# Patient Record
Sex: FEMALE | Race: OTHER | HISPANIC OR LATINO | URBAN - METROPOLITAN AREA
[De-identification: names, ages, dates, MRNs, and addresses within clinical notes are randomized per-mention and may not be internally consistent; named-entity substitution may affect disease eponyms.]

---

## 2019-10-27 ENCOUNTER — EMERGENCY (EMERGENCY)
Facility: HOSPITAL | Age: 59
LOS: 1 days | Discharge: HOME | End: 2019-10-27
Attending: EMERGENCY MEDICINE
Payer: COMMERCIAL

## 2019-10-27 VITALS
HEIGHT: 64 IN | SYSTOLIC BLOOD PRESSURE: 145 MMHG | TEMPERATURE: 98 F | DIASTOLIC BLOOD PRESSURE: 68 MMHG | RESPIRATION RATE: 20 BRPM | OXYGEN SATURATION: 99 % | HEART RATE: 73 BPM | WEIGHT: 158.07 LBS

## 2019-10-27 LAB
ALBUMIN SERPL ELPH-MCNC: 4.3 G/DL — SIGNIFICANT CHANGE UP (ref 3.3–5.2)
ALP SERPL-CCNC: 82 U/L — SIGNIFICANT CHANGE UP (ref 40–120)
ALT FLD-CCNC: 19 U/L — SIGNIFICANT CHANGE UP
ANION GAP SERPL CALC-SCNC: 15 MMOL/L — SIGNIFICANT CHANGE UP (ref 5–17)
APPEARANCE UR: CLEAR — SIGNIFICANT CHANGE UP
AST SERPL-CCNC: 20 U/L — SIGNIFICANT CHANGE UP
BASOPHILS # BLD AUTO: 0.04 K/UL — SIGNIFICANT CHANGE UP (ref 0–0.2)
BASOPHILS NFR BLD AUTO: 0.4 % — SIGNIFICANT CHANGE UP (ref 0–2)
BILIRUB SERPL-MCNC: 0.7 MG/DL — SIGNIFICANT CHANGE UP (ref 0.4–2)
BILIRUB UR-MCNC: NEGATIVE — SIGNIFICANT CHANGE UP
BUN SERPL-MCNC: 12 MG/DL — SIGNIFICANT CHANGE UP (ref 8–20)
CALCIUM SERPL-MCNC: 9.2 MG/DL — SIGNIFICANT CHANGE UP (ref 8.6–10.2)
CHLORIDE SERPL-SCNC: 101 MMOL/L — SIGNIFICANT CHANGE UP (ref 98–107)
CO2 SERPL-SCNC: 25 MMOL/L — SIGNIFICANT CHANGE UP (ref 22–29)
COLOR SPEC: YELLOW — SIGNIFICANT CHANGE UP
CREAT SERPL-MCNC: 0.64 MG/DL — SIGNIFICANT CHANGE UP (ref 0.5–1.3)
DIFF PNL FLD: NEGATIVE — SIGNIFICANT CHANGE UP
EOSINOPHIL # BLD AUTO: 0.16 K/UL — SIGNIFICANT CHANGE UP (ref 0–0.5)
EOSINOPHIL NFR BLD AUTO: 1.6 % — SIGNIFICANT CHANGE UP (ref 0–6)
GLUCOSE SERPL-MCNC: 91 MG/DL — SIGNIFICANT CHANGE UP (ref 70–115)
GLUCOSE UR QL: NEGATIVE MG/DL — SIGNIFICANT CHANGE UP
HCT VFR BLD CALC: 44.3 % — SIGNIFICANT CHANGE UP (ref 34.5–45)
HGB BLD-MCNC: 14.8 G/DL — SIGNIFICANT CHANGE UP (ref 11.5–15.5)
IMM GRANULOCYTES NFR BLD AUTO: 0.4 % — SIGNIFICANT CHANGE UP (ref 0–1.5)
KETONES UR-MCNC: NEGATIVE — SIGNIFICANT CHANGE UP
LACTATE BLDV-MCNC: 0.8 MMOL/L — SIGNIFICANT CHANGE UP (ref 0.5–2)
LEUKOCYTE ESTERASE UR-ACNC: NEGATIVE — SIGNIFICANT CHANGE UP
LIDOCAIN IGE QN: 26 U/L — SIGNIFICANT CHANGE UP (ref 22–51)
LYMPHOCYTES # BLD AUTO: 2.38 K/UL — SIGNIFICANT CHANGE UP (ref 1–3.3)
LYMPHOCYTES # BLD AUTO: 23.8 % — SIGNIFICANT CHANGE UP (ref 13–44)
MCHC RBC-ENTMCNC: 29.7 PG — SIGNIFICANT CHANGE UP (ref 27–34)
MCHC RBC-ENTMCNC: 33.4 GM/DL — SIGNIFICANT CHANGE UP (ref 32–36)
MCV RBC AUTO: 88.8 FL — SIGNIFICANT CHANGE UP (ref 80–100)
MONOCYTES # BLD AUTO: 0.7 K/UL — SIGNIFICANT CHANGE UP (ref 0–0.9)
MONOCYTES NFR BLD AUTO: 7 % — SIGNIFICANT CHANGE UP (ref 2–14)
NEUTROPHILS # BLD AUTO: 6.67 K/UL — SIGNIFICANT CHANGE UP (ref 1.8–7.4)
NEUTROPHILS NFR BLD AUTO: 66.8 % — SIGNIFICANT CHANGE UP (ref 43–77)
NITRITE UR-MCNC: NEGATIVE — SIGNIFICANT CHANGE UP
PH UR: 7 — SIGNIFICANT CHANGE UP (ref 5–8)
PLATELET # BLD AUTO: 199 K/UL — SIGNIFICANT CHANGE UP (ref 150–400)
POTASSIUM SERPL-MCNC: 3.9 MMOL/L — SIGNIFICANT CHANGE UP (ref 3.5–5.3)
POTASSIUM SERPL-SCNC: 3.9 MMOL/L — SIGNIFICANT CHANGE UP (ref 3.5–5.3)
PROT SERPL-MCNC: 7.9 G/DL — SIGNIFICANT CHANGE UP (ref 6.6–8.7)
PROT UR-MCNC: NEGATIVE MG/DL — SIGNIFICANT CHANGE UP
RBC # BLD: 4.99 M/UL — SIGNIFICANT CHANGE UP (ref 3.8–5.2)
RBC # FLD: 12.2 % — SIGNIFICANT CHANGE UP (ref 10.3–14.5)
SODIUM SERPL-SCNC: 141 MMOL/L — SIGNIFICANT CHANGE UP (ref 135–145)
SP GR SPEC: 1.01 — SIGNIFICANT CHANGE UP (ref 1.01–1.02)
UROBILINOGEN FLD QL: NEGATIVE MG/DL — SIGNIFICANT CHANGE UP
WBC # BLD: 9.99 K/UL — SIGNIFICANT CHANGE UP (ref 3.8–10.5)
WBC # FLD AUTO: 9.99 K/UL — SIGNIFICANT CHANGE UP (ref 3.8–10.5)

## 2019-10-27 PROCEDURE — 81003 URINALYSIS AUTO W/O SCOPE: CPT

## 2019-10-27 PROCEDURE — T1013: CPT

## 2019-10-27 PROCEDURE — 96361 HYDRATE IV INFUSION ADD-ON: CPT

## 2019-10-27 PROCEDURE — 85027 COMPLETE CBC AUTOMATED: CPT

## 2019-10-27 PROCEDURE — 80053 COMPREHEN METABOLIC PANEL: CPT

## 2019-10-27 PROCEDURE — 74177 CT ABD & PELVIS W/CONTRAST: CPT | Mod: 26

## 2019-10-27 PROCEDURE — 83605 ASSAY OF LACTIC ACID: CPT

## 2019-10-27 PROCEDURE — 96374 THER/PROPH/DIAG INJ IV PUSH: CPT | Mod: XU

## 2019-10-27 PROCEDURE — 99285 EMERGENCY DEPT VISIT HI MDM: CPT

## 2019-10-27 PROCEDURE — 36415 COLL VENOUS BLD VENIPUNCTURE: CPT

## 2019-10-27 PROCEDURE — 83690 ASSAY OF LIPASE: CPT

## 2019-10-27 PROCEDURE — 99284 EMERGENCY DEPT VISIT MOD MDM: CPT | Mod: 25

## 2019-10-27 PROCEDURE — 74177 CT ABD & PELVIS W/CONTRAST: CPT

## 2019-10-27 RX ORDER — ONDANSETRON 8 MG/1
4 TABLET, FILM COATED ORAL ONCE
Refills: 0 | Status: COMPLETED | OUTPATIENT
Start: 2019-10-27 | End: 2019-10-27

## 2019-10-27 RX ORDER — MORPHINE SULFATE 50 MG/1
4 CAPSULE, EXTENDED RELEASE ORAL ONCE
Refills: 0 | Status: DISCONTINUED | OUTPATIENT
Start: 2019-10-27 | End: 2019-10-27

## 2019-10-27 RX ORDER — SODIUM CHLORIDE 9 MG/ML
1000 INJECTION INTRAMUSCULAR; INTRAVENOUS; SUBCUTANEOUS ONCE
Refills: 0 | Status: COMPLETED | OUTPATIENT
Start: 2019-10-27 | End: 2019-10-27

## 2019-10-27 RX ADMIN — SODIUM CHLORIDE 1000 MILLILITER(S): 9 INJECTION INTRAMUSCULAR; INTRAVENOUS; SUBCUTANEOUS at 12:22

## 2019-10-27 RX ADMIN — SODIUM CHLORIDE 1000 MILLILITER(S): 9 INJECTION INTRAMUSCULAR; INTRAVENOUS; SUBCUTANEOUS at 15:31

## 2019-10-27 RX ADMIN — SODIUM CHLORIDE 1000 MILLILITER(S): 9 INJECTION INTRAMUSCULAR; INTRAVENOUS; SUBCUTANEOUS at 15:29

## 2019-10-27 RX ADMIN — MORPHINE SULFATE 4 MILLIGRAM(S): 50 CAPSULE, EXTENDED RELEASE ORAL at 15:50

## 2019-10-27 NOTE — ED ADULT NURSE NOTE - PAIN: PRESENCE, MLM
complains of pain/discomfort
Is This A New Presentation, Or A Follow-Up?: Skin Lesions
What Type Of Note Output Would You Prefer (Optional)?: Standard Output
How Severe Is Your Skin Lesion?: mild
Has Your Skin Lesion Been Treated?: not been treated
Additional History: \\nHe has some rough raised spots on scalp and left elbow and a tender spot on right temple. He has hx of AK and SCC in situ.

## 2019-10-27 NOTE — ED STATDOCS - OBJECTIVE STATEMENT
58 y/o female no pmh c/o 2 days of llq pain. No n/v/d/obstipation/f/c/dysuria. Had fibroids and one ovary removed in past. Secondary complaint of lip swelling 2 months ago that resolved (chapping).     ROS: No fever/chills. No eye pain/changes in vision, No ear pain/sore throat/dysphagia, No chest pain/palpitations. No SOB/cough/. No  N/V/D, no black/bloody bm. No dysuria/frequency/discharge, No headache. No Dizziness.    No rashes or breaks in skin. No numbness/tingling/weakness.

## 2019-10-27 NOTE — ED ADULT NURSE NOTE - OBJECTIVE STATEMENT
59 yof presents to ed c/o llq ab pain denies n/v/d denies headache/ fever chills. pain started 2 days ago and has gotten worse denies recent sick contacts. nothing makes pain better or worse. ambulates

## 2019-10-27 NOTE — ED STATDOCS - CARE PLAN
Principal Discharge DX:	Inguinal hernia without obstruction or gangrene, recurrence not specified, unspecified laterality

## 2019-10-27 NOTE — ED STATDOCS - ATTENDING CONTRIBUTION TO CARE
Makenzie: I performed a face to face bedside interview with patient regarding history of present illness, review of symptoms and past medical history. I completed an independent physical exam and ordered tests/medications as needed.  I have discussed patient's plan of care with advanced care provider. The advanced care provider assisted in  executing the discussed plan. I was available for any questions or issues that may have arose during the execution of the plan of care.

## 2019-10-27 NOTE — ED STATDOCS - PHYSICAL EXAMINATION
Gen: No acute distress, non toxic  HEENT: Mucous membranes moist, pink conjunctivae, EOMI. chap lips, no significnat swelling.   CV: RRR, nl s1/s2.  Resp: CTAB, normal rate and effort  GI: llq ttp, no rebound, soft. neg murphys  : No CVAT  Neuro: A&O x 3, moving all 4 extremities  MSK: No spine or joint tenderness to palpation  Skin: No rashes. intact and perfused.

## 2019-10-27 NOTE — CONSULT NOTE ADULT - SUBJECTIVE AND OBJECTIVE BOX
ACUTE CARE SURGERY CONSULT    Consulting surgical team: Acute Care Surgery  Consulting attending: Dr. Obey Guerra  Patient seen and examined: 10-27-19 @ 15:44    HPI:  60yo female presents to the ED with left groin pain that started yesterday afternoon. Patient has felt a "bump" on the left groin for the past few months which she desribess becomes a bit painful intermittently, but is always self-limited and resolves. Patient denies changes in bowel movements. Last BM was this morning. Currently, denies chest pain, SOB, nausea, vomiting, fevers or chills.     PAST MEDICAL HISTORY:  uterine fibroids    PAST SURGICAL HISTORY:  oophorectomy, hysterectomy    ALLERGIES:  No Known Allergies      FAMILY HISTORY:  noncontributory    SOCIAL HISTORY:  denies toxic habits  HOME MEDICATIONS:  none    MEDICATIONS  (STANDING):    MEDICATIONS  (PRN):      VITALS & I/Os:  Vital Signs Last 24 Hrs  T(C): 36.7 (27 Oct 2019 11:27), Max: 36.7 (27 Oct 2019 11:27)  T(F): 98 (27 Oct 2019 11:27), Max: 98 (27 Oct 2019 11:27)  HR: 73 (27 Oct 2019 11:27) (73 - 73)  BP: 145/68 (27 Oct 2019 11:27) (145/68 - 145/68)  BP(mean): --  RR: 20 (27 Oct 2019 11:27) (20 - 20)  SpO2: 99% (27 Oct 2019 11:27) (99% - 99%)  CAPILLARY BLOOD GLUCOSE          I&O's Summary      GENERAL: Alert, in no acute distress.  MENTAL STATUS: AAOx3. Appropriate affect.  HEENT: PERRLA. EOMI  RESPIRATORY: CTAB  CARDIOVASCULAR: RRR  GASTROINTESTINAL: Abdomen soft, NT, ND, -R/-G.; left inguinal hernia, tender, reducible      LABS:                        14.8   9.99  )-----------( 199      ( 27 Oct 2019 12:27 )             44.3     10-27    141  |  101  |  12.0  ----------------------------<  91  3.9   |  25.0  |  0.64    Ca    9.2      27 Oct 2019 12:27    TPro  7.9  /  Alb  4.3  /  TBili  0.7  /  DBili  x   /  AST  20  /  ALT  19  /  AlkPhos  82  10-27    Lactate:            10-27 @ 15:25  0.8    Urinalysis Basic - ( 27 Oct 2019 11:53 )    Color: Yellow / Appearance: Clear / S.010 / pH: x  Gluc: x / Ketone: Negative  / Bili: Negative / Urobili: Negative mg/dL   Blood: x / Protein: Negative mg/dL / Nitrite: Negative   Leuk Esterase: Negative / RBC: x / WBC x   Sq Epi: x / Non Sq Epi: x / Bacteria: x        IMAGING:  < from: CT Abdomen and Pelvis w/ Oral Cont and w/ IV Cont (10.27.19 @ 14:31) >     EXAM:  CT ABDOMEN AND PELVIS OC IC                          PROCEDURE DATE:  10/27/2019          INTERPRETATION:  CLINICAL INFORMATION: Left lower quadrant abdominal pain    COMPARISON: None.    PROCEDURE:   CT of the Abdomen and Pelvis was performed with intravenous contrast.   Intravenous contrast: 96 ml Omnipaque 300. 4 ml discarded.  Oral contrast: positive contrast was administered.  Sagittal and coronal reformats were performed.    FINDINGS:    LOWER CHEST: Within normal limits.    LIVER: Steatosis. A less than 5 mm hypodense focus is identified within   the posterior right hepatic lobe, too small to characterize.  BILE DUCTS: Normal caliber.  GALLBLADDER: Within normal limits. No gallstones, gallbladder wall   thickening or pericholecystic fluid identified.  SPLEEN: Within normal limits.  PANCREAS: Within normal limits.  ADRENALS: Within normal limits.  KIDNEYS/URETERS: There is no evidence for bilateral hydronephrosis, renal   calculi or space-occupying lesions of the renal parenchyma.    BLADDER: Minimally distended.  REPRODUCTIVE ORGANS: The uterus is not visualized. Correlate with   surgical history.    BOWEL: Fecal material scattered throughout the colon. No mechanical bowel   obstruction is identified. No colonic wall thickening is noted. There is   no evidence for acute appendicitis.  PERITONEUM: There is a fat-containing left inguinal hernia with   circumferential thickening of the left inguinal canal margin and trace   free fluid. There is stranding of the left lower quadrant omental   mesenteric fat with thickening of the adjacent fascial plane. This   appearance is highly suggestive for incarceration/infarction of the left   inguinal herniated fat.  VESSELS: Within normal limits.  RETROPERITONEUM/LYMPH NODES: No lymphadenopathy.    ABDOMINAL WALL: Pelvic floor weakness is identified with cystocele and   enterocele formation.  BONES: No osseous fractures identified.    IMPRESSION:     There is a fat-containing left inguinal hernia with circumferential   thickening of the left inguinal canal margin and trace free fluid. There   is stranding of the left lower quadrant omental mesenteric fat with   thickening of the adjacent fascial plane. This appearance is highly   suggestive for incarceration/infarction of the left inguinal herniated   fat. Pelvic floor weakness is identified with cystocele and enterocele   formation.    Findings were discussed with Dr. CANDI WEBSTER [id number] 10/27/2019   3:08 PM by Dr. Tai with read back confirmation.                  MIR TAI   This document has been electronically signed. Oct 27 2019  3:08PM                  < end of copied text >

## 2019-10-27 NOTE — CONSULT NOTE ADULT - ASSESSMENT
48yo female with reducible left inguinal hernia.   - Patient's left inguinal hernia reduced at bedside after IV pain medication administration and ice packs to left groin  - Will reexamine patient in 1-2hrs, if patient's symptoms improve, will be able to d/c home 50yo female with reducible left inguinal hernia.   - Patient's left inguinal hernia reduced at bedside after IV pain medication administration and ice packs to left groin  - Will reexamine patient in 1-2hrs, if patient's symptoms improve, will be able to d/c home      Addendum:  -Reevaluated patient, less tender from left inguinal area, hernia reduced, able to ambualte independently  -No acute surgical intervention at this time  -Follow-up with Dr. Guerra 1-2 weeks

## 2019-10-27 NOTE — ED STATDOCS - PATIENT PORTAL LINK FT
You can access the FollowMyHealth Patient Portal offered by [facility] by registering at the following website: [email or web address]. By joining [employer]’s FollowMyHealth portal, you will also be able to view your health information using other applications (apps) compatible with our system.

## 2019-10-27 NOTE — ED STATDOCS - PROGRESS NOTE DETAILS
pt is seen by dr miller initially agreed with hx , PE and plan ,   CT with incarcerated hernia , bene told to the pain ,lactate and pain med given ,pt is not in acute pain , called surgery team , to seen the pt , lactate is negative pt is seen by dr miller initially agreed with hx , PE and plan ,   CT with fat inguinal hernia , bene told to the pain ,lactate and pain med given ,pt is not in acute pain , called surgery team , to seen the pt , lactate is negative as per surgery they reduced the fat on left side hernia , states they will come and re access the pt , as per surgery pt is stable cane morris jacob within 1 week, all result hared with pt as per surgery they reduced the fat on left side hernia , states they will come and re access the pt , as per surgery pt is stable cane be dYennic f.u dr jacob within 1 week, all result hared with pt  all d.c instruction verbalized to the pt , daughter translated to the pt , also written I Greenlandic , she understood and agreed

## 2019-11-04 ENCOUNTER — APPOINTMENT (OUTPATIENT)
Dept: OBGYN | Facility: CLINIC | Age: 59
End: 2019-11-04
Payer: COMMERCIAL

## 2019-11-04 VITALS
DIASTOLIC BLOOD PRESSURE: 70 MMHG | SYSTOLIC BLOOD PRESSURE: 120 MMHG | BODY MASS INDEX: 24.29 KG/M2 | HEIGHT: 62 IN | WEIGHT: 132 LBS

## 2019-11-04 DIAGNOSIS — Z01.419 ENCOUNTER FOR GYNECOLOGICAL EXAMINATION (GENERAL) (ROUTINE) W/OUT ABNORMAL FINDINGS: ICD-10-CM

## 2019-11-04 DIAGNOSIS — Z78.9 OTHER SPECIFIED HEALTH STATUS: ICD-10-CM

## 2019-11-04 DIAGNOSIS — Z12.11 ENCOUNTER FOR SCREENING FOR MALIGNANT NEOPLASM OF COLON: ICD-10-CM

## 2019-11-04 DIAGNOSIS — Z12.39 ENCOUNTER FOR OTHER SCREENING FOR MALIGNANT NEOPLASM OF BREAST: ICD-10-CM

## 2019-11-04 DIAGNOSIS — Z72.3 LACK OF PHYSICAL EXERCISE: ICD-10-CM

## 2019-11-04 LAB
DATE COLLECTED: NORMAL
HEMOCCULT SP1 STL QL: NEGATIVE
QUALITY CONTROL: YES

## 2019-11-04 PROCEDURE — 99396 PREV VISIT EST AGE 40-64: CPT

## 2019-11-04 PROCEDURE — 82270 OCCULT BLOOD FECES: CPT

## 2019-11-04 NOTE — HISTORY OF PRESENT ILLNESS
[___ Year(s) Ago] : [unfilled] year(s) ago [Good] : being in good health [Healthy Diet] : a healthy diet [Weight Concerns] : weight concens [Last Mammogram ___] : Last Mammogram was [unfilled] [Last Bone Density ___] : Last bone density studies [unfilled] [Last Pap ___] : Last cervical pap smear was [unfilled] [No Previous Colonoscopy] : no previous colonoscopy [Postmenopausal] : is postmenopausal [Pregnancy History] : pregnancy history: [Total Preg ___] : [unfilled] [Full Term ___] : [unfilled] [Living ___] : [unfilled] [Multiple Births ___] :  multiple birth pregnancies: [unfilled] [HPV Vaccine NA Due to Age] : HPV vaccine not available to patient due to age [unknown] : the patient is unsure of the date of her LMP [Menarche Age: ____] : age at menarche was [unfilled] [Regular Exercise] : not exercising regularly [de-identified] : BREAST ULTRA: 7/22/2014 BR3  BREAST BIOPSY: 10/10/2008  [Sexually Active] : is not sexually active [Contraception] : does not use contraception

## 2019-11-04 NOTE — END OF VISIT
[FreeTextEntry3] : I, Shawn Au, acted solely as a scribe for Dr. Mena on this date 11/04/2019.\par All medical record entries made by the Scribe were at my, Dr. Mena's direction and personally dictated by me on  11/04/2019. I have reviewed the chart and agree that the record accurately reflects my personal performance of the history, physical exam, assessment and plan. I have also personally directed, reviewed, and agreed with the chart.\par \par

## 2019-11-04 NOTE — PHYSICAL EXAM
[Awake] : awake [Alert] : alert [Examination Of The Breasts] : a normal appearance [No Discharge] : no discharge [No Masses] : no breast masses were palpable [Soft] : soft [Oriented x3] : oriented to person, place, and time [Labia Majora] : labia major [Labia Minora] : labia minora [Normal] : clitoris [No Bleeding] : there was no active vaginal bleeding [Pap Obtained] : a Pap smear was performed [No Tenderness] : no rectal tenderness [Nl Sphincter Tone] : normal sphincter tone [Acute Distress] : no acute distress [Tender] : non tender [Distended] : not distended [H/Smegaly] : no hepatosplenomegaly [Depressed Mood] : not depressed [Flat Affect] : affect not flat [Atrophy] : atrophy [Dry Mucosa] : dry mucosa [Occult Blood] : occult blood test from digital rectal exam was negative [FreeTextEntry4] : vault prolapse

## 2019-11-05 LAB — HPV HIGH+LOW RISK DNA PNL CVX: NOT DETECTED

## 2019-11-11 LAB — CYTOLOGY CVX/VAG DOC THIN PREP: NORMAL

## 2019-12-19 ENCOUNTER — APPOINTMENT (OUTPATIENT)
Age: 59
End: 2019-12-19
Payer: COMMERCIAL

## 2019-12-19 ENCOUNTER — RESULT CHARGE (OUTPATIENT)
Age: 59
End: 2019-12-19

## 2019-12-19 VITALS
DIASTOLIC BLOOD PRESSURE: 69 MMHG | SYSTOLIC BLOOD PRESSURE: 149 MMHG | HEIGHT: 62 IN | WEIGHT: 129 LBS | BODY MASS INDEX: 23.74 KG/M2

## 2019-12-19 DIAGNOSIS — N81.9 FEMALE GENITAL PROLAPSE, UNSPECIFIED: ICD-10-CM

## 2019-12-19 DIAGNOSIS — R33.9 RETENTION OF URINE, UNSPECIFIED: ICD-10-CM

## 2019-12-19 DIAGNOSIS — K46.9 UNSPECIFIED ABDOMINAL HERNIA W/OUT OBSTRUCTION OR GANGRENE: ICD-10-CM

## 2019-12-19 LAB
BILIRUB UR QL STRIP: NEGATIVE
CLARITY UR: CLEAR
COLLECTION METHOD: NORMAL
GLUCOSE UR-MCNC: NEGATIVE
HCG UR QL: 0.2 EU/DL
HGB UR QL STRIP.AUTO: NORMAL
KETONES UR-MCNC: NEGATIVE
LEUKOCYTE ESTERASE UR QL STRIP: NEGATIVE
NITRITE UR QL STRIP: NEGATIVE
PH UR STRIP: 5
PROT UR STRIP-MCNC: NEGATIVE
SP GR UR STRIP: 1

## 2019-12-19 PROCEDURE — 99244 OFF/OP CNSLTJ NEW/EST MOD 40: CPT | Mod: 25

## 2019-12-19 PROCEDURE — 51701 INSERT BLADDER CATHETER: CPT

## 2019-12-19 NOTE — PROCEDURE
[FreeTextEntry1] : sterile straight catheterization performed to assess post void residual due to incomplete emptying

## 2019-12-19 NOTE — HISTORY OF PRESENT ILLNESS
[FreeTextEntry1] : \par Yessica presents with evaluation of vaginal bulge, she reports having a ARIAS/LSO, A/P/E alissa plication, perineal repair, she reports bothersome vaginal bulge for the past year, she denies vaginal bleeding, denies incontinence, denies hematuria, denies dysuria, denies frequency, denies pelvic pain, denies incomplete emptying, denies intermittent stream, denies pelvic pain, not sexually active \par \par she did have a CT showing an inguinal hernia

## 2019-12-19 NOTE — DISCUSSION/SUMMARY
[FreeTextEntry1] : \par Yessica presents with c/o of post hysterectomy vaginal bulge, on exam PVR 100cc and complete vault eversion. Visual illustrations were used to demonstrate prolapse. We reviewed management options for her prolapse including: observation, pelvic floor exercises with and without physical therapy, pessary, and surgical management. We reviewed the different types of surgical procedures including abdominal, vaginal, and robotic/laparoscopic procedures. Mesh and non-mesh options were reviewed. Discussed robotic sacral colpopexy given complete vault prolapse. Patient is interested in surgery. Recommend cystoscopy and UDS prior to surgery. All questions were answered. Nanigans information on prolapse in Wol[place] was given to her. Recommend referral to Gen Surg for eval of inguinal hernia and possible surgical correction at same time of prolapse repair.\par \par [] u/a, culture\par [] UDS\par [] cystoscopy\par [] gen surg referral for eval for possible hernia and surgical management at time of robotic hysterectomy\par [] anticipate robotic sacral colpopexy, cystoscopy, sling pending UDS

## 2019-12-19 NOTE — PHYSICAL EXAM
[No Acute Distress] : in no acute distress [Well developed] : well developed [Normal Memory] : ~T memory was ~L unimpaired [Oriented x3] : oriented to person, place, and time [Well Nourished] : ~L well nourished [No Edema] : ~T edema was not present [Normal Mood/Affect] : mood and affect are normal [Scar] : a scar was noted [Suprapubic] : in the suprapubic area [None] : no CVA tenderness [Labia Majora] : were normal [Labia Minora] : were normal [Atrophy] : atrophy [No Bleeding] : there was no active vaginal bleeding [3] : 3 [Aa ____] : Aa [unfilled] [Ba ____] : Ba [unfilled] [C ____] : C [unfilled] [GH ____] : GH [unfilled] [PB ____] : PB [unfilled] [TVL ____] : TVL  [unfilled] [Ap ____] : Ap [unfilled] [Bp ____] : Bp [unfilled] [D ____] : D [unfilled] [Absent] : absent [Adnexa Absent] : absent bilaterally [Normal] : no abnormalities [Post Void Residual ____ml] : post void residual via catheterization was [unfilled] ml [Cough] : no cough [Tenderness] : ~T no ~M abdominal tenderness observed [Distended] : not distended [FreeTextEntry3] : neg CST

## 2019-12-20 ENCOUNTER — RESULT REVIEW (OUTPATIENT)
Age: 59
End: 2019-12-20

## 2019-12-20 LAB
APPEARANCE: CLEAR
BACTERIA: NEGATIVE
BILIRUBIN URINE: NEGATIVE
BLOOD URINE: NEGATIVE
COLOR: COLORLESS
GLUCOSE QUALITATIVE U: NEGATIVE
HYALINE CASTS: 0 /LPF
KETONES URINE: NEGATIVE
LEUKOCYTE ESTERASE URINE: NEGATIVE
MICROSCOPIC-UA: NORMAL
NITRITE URINE: NEGATIVE
PH URINE: 6
PROTEIN URINE: NEGATIVE
RED BLOOD CELLS URINE: 0 /HPF
SPECIFIC GRAVITY URINE: 1
SQUAMOUS EPITHELIAL CELLS: 0 /HPF
UROBILINOGEN URINE: NORMAL
WHITE BLOOD CELLS URINE: 0 /HPF

## 2019-12-23 ENCOUNTER — RESULT REVIEW (OUTPATIENT)
Age: 59
End: 2019-12-23

## 2019-12-23 LAB — BACTERIA UR CULT: NORMAL

## 2020-01-09 ENCOUNTER — APPOINTMENT (OUTPATIENT)
Dept: UROGYNECOLOGY | Facility: CLINIC | Age: 60
End: 2020-01-09
Payer: COMMERCIAL

## 2020-01-09 PROCEDURE — 51797 INTRAABDOMINAL PRESSURE TEST: CPT

## 2020-01-09 PROCEDURE — 51741 ELECTRO-UROFLOWMETRY FIRST: CPT

## 2020-01-09 PROCEDURE — 51784 ANAL/URINARY MUSCLE STUDY: CPT

## 2020-01-09 PROCEDURE — 51729 CYSTOMETROGRAM W/VP&UP: CPT

## 2020-01-15 ENCOUNTER — APPOINTMENT (OUTPATIENT)
Dept: SURGERY | Facility: CLINIC | Age: 60
End: 2020-01-15
Payer: COMMERCIAL

## 2020-01-15 VITALS
SYSTOLIC BLOOD PRESSURE: 154 MMHG | HEIGHT: 61.5 IN | TEMPERATURE: 97.8 F | BODY MASS INDEX: 25.53 KG/M2 | HEART RATE: 71 BPM | DIASTOLIC BLOOD PRESSURE: 89 MMHG | WEIGHT: 137 LBS | OXYGEN SATURATION: 96 %

## 2020-01-15 PROCEDURE — 99243 OFF/OP CNSLTJ NEW/EST LOW 30: CPT

## 2020-01-16 NOTE — PHYSICAL EXAM
[No Rash or Lesion] : No rash or lesion [Oriented to Person] : oriented to person [Alert] : alert [Oriented to Place] : oriented to place [Oriented to Time] : oriented to time [Calm] : calm [JVD] : no jugular venous distention  [de-identified] : No acute distress [de-identified] : soft, nontender. no rebound or guarding. palpable left inguinal hernia - reducible, nontender, no overlying skin changes [de-identified] : Regular rate [de-identified] : No respiratory distress [de-identified] : normal range of motion

## 2020-01-16 NOTE — ASSESSMENT
[FreeTextEntry1] : Ms. BRYSON is a 59 year old woman with vaginal prolapse, cystocele with prolapse, and rectal prolapse who is planned to undergo robotic sacral colpopexy who also has a symptomatic left inguinal hernia. The risks benefits and alternatives of operative repair vs nonoperative management were discussed at length and all questions were answered. The patient appears to understand, agrees, and wishes to proceed with robotic left inguinal hernia repair during same operative procedure as planned robotic sacral colpopexy. \par

## 2020-01-16 NOTE — CONSULT LETTER
[Dear  ___] : Dear  [unfilled], [Consult Letter:] : I had the pleasure of evaluating your patient, [unfilled]. [Consult Closing:] : Thank you very much for allowing me to participate in the care of this patient.  If you have any questions, please do not hesitate to contact me. [Please see my note below.] : Please see my note below. [FreeTextEntry3] : Yan Esquivel MD\par General, Laparoscopic, and Bariatric Surgery\par [facility]\par  [Sincerely,] : Sincerely, [DrYenni  ___] : Dr. DELA CRUZ

## 2020-01-16 NOTE — PLAN
[FreeTextEntry1] : Plan for robotic left inguinal hernia repair during same operative procedure as robotic sacral colpopexy

## 2020-01-16 NOTE — HISTORY OF PRESENT ILLNESS
[de-identified] : Ms. BRYSON is a 59 year old woman with vaginal prolapse, cystocele with prolapse, and rectal prolapse who is planned to undergo robotic sacral colpopexy who was also noted to have a symptomatic left inguinal hernia, referred by Dr. Priti Iglesias (OB/GYN) for evaluation. Pt reports that left groin bulge was first noticed approximately 2 months ago. Reports intermittent pain, especially at end of day and with activity. No incarceration. Denies fever/chills/nausea/emesis or changes in bowel or bladder habits. Tolerating diet. Normal bowel movements.\par \par CT scan confirms the presence of a left fat-containing inguinal hernia\par

## 2020-01-23 ENCOUNTER — APPOINTMENT (OUTPATIENT)
Age: 60
End: 2020-01-23
Payer: COMMERCIAL

## 2020-01-23 PROCEDURE — 52000 CYSTOURETHROSCOPY: CPT

## 2020-01-30 ENCOUNTER — APPOINTMENT (OUTPATIENT)
Dept: UROGYNECOLOGY | Facility: CLINIC | Age: 60
End: 2020-01-30
Payer: COMMERCIAL

## 2020-01-30 DIAGNOSIS — N81.6 RECTOCELE: ICD-10-CM

## 2020-01-30 DIAGNOSIS — N99.3 PROLAPSE OF VAGINAL VAULT AFTER HYSTERECTOMY: ICD-10-CM

## 2020-01-30 DIAGNOSIS — N39.3 STRESS INCONTINENCE (FEMALE) (MALE): ICD-10-CM

## 2020-01-30 DIAGNOSIS — N81.4 UTEROVAGINAL PROLAPSE, UNSPECIFIED: ICD-10-CM

## 2020-01-30 PROCEDURE — 99214 OFFICE O/P EST MOD 30 MIN: CPT

## 2020-01-30 NOTE — HISTORY OF PRESENT ILLNESS
[FreeTextEntry1] : \par Yessica presents today with stage IV vaginal vault prolapse, UDS with SHIRIN at capacity, h/o of hysterectomy\par \par pt with inguinal hernia and plan for combined case with general surgery\par \par cystoscopy today repeated and clear efflux noted bilaterally \par \par pt declines non surgical management

## 2020-01-30 NOTE — DISCUSSION/SUMMARY
[FreeTextEntry1] : \par Yessica and her daughter presented today for surgical counseling.The patient presented to the office today for counseling regarding her decision for possible pelvic reconstructive surgery. All pertinent prior studies including urodynamic testing were reviewed. 						\par The patient was counseled regarding alternative non-surgical therapies as well as the prognosis with no intervention.\par \par The patient was advised regarding various surgical options including abdominal, robotic/laparoscopic and vaginal approaches. The risks and benefits of surgery using endogenous tissue only versus the use of graft insertion (mesh) were fully reviewed.  She was informed of the potential for improved durability and the inherent risk of graft use including but not limited to infection, erosion and chronic inflammation, acute and chronic pain, pain with intercourse (both of which may be refractory to treatment) fistula, cervical elongation, disturbance in bowel or bladder function, any of which may require additional surgery for mesh revision.  She is aware that the mesh used in her surgery is a permanent mesh.  The patient was advised regarding the July 2011 FDA notification regarding these issues and provided the website address for further reference www.fda.gov.  \par \par The general risks of the surgery were reviewed including, but not limited to infection, bleeding, including transfusion, surrounding organ or tissue injury (bladder, rectum, bowel, urethra, ureters, nerves vessels or muscles), failure meaning recurrent prolapse and/or continued leaking, voiding dysfunction, needing to go home with a catheter, pain with sex, blood clots, and anesthesia.\par \par The approximate length of the surgery, hospital stay and postoperative recovery period were reviewed, including a general overview of convalescence and postoperative follow-up.\par \par The patient verbalized a desire to proceed with the surgery.  Appropriate informed consent was obtained.  All questions were answered to the patient’s satisfaction.\par \par [facility] handout in Turkmen given to her on sacral colpopexy and sling.\par \par Combined case with Dr. Esquivel\par \par [] consent for robotic sacral colpopexy, sling, cystoscopy, possible laparotomy, possible a/p repair, any other indicated procedures\par \par \par

## 2020-01-30 NOTE — REASON FOR VISIT
[Follow-up Visit ___] : a follow-up visit  for [unfilled] [FreeTextEntry1] : [id number] [FreeTextEntry2] : Rizwana [TWNoteComboBox1] : Portuguese

## 2020-02-05 ENCOUNTER — OUTPATIENT (OUTPATIENT)
Dept: OUTPATIENT SERVICES | Facility: HOSPITAL | Age: 60
LOS: 1 days | End: 2020-02-05
Payer: COMMERCIAL

## 2020-02-05 VITALS
RESPIRATION RATE: 16 BRPM | SYSTOLIC BLOOD PRESSURE: 131 MMHG | TEMPERATURE: 97 F | WEIGHT: 134.48 LBS | DIASTOLIC BLOOD PRESSURE: 71 MMHG | HEART RATE: 66 BPM | HEIGHT: 61 IN

## 2020-02-05 DIAGNOSIS — N99.3 PROLAPSE OF VAGINAL VAULT AFTER HYSTERECTOMY: ICD-10-CM

## 2020-02-05 DIAGNOSIS — K40.90 UNILATERAL INGUINAL HERNIA, WITHOUT OBSTRUCTION OR GANGRENE, NOT SPECIFIED AS RECURRENT: ICD-10-CM

## 2020-02-05 DIAGNOSIS — Z01.818 ENCOUNTER FOR OTHER PREPROCEDURAL EXAMINATION: ICD-10-CM

## 2020-02-05 DIAGNOSIS — Z90.710 ACQUIRED ABSENCE OF BOTH CERVIX AND UTERUS: Chronic | ICD-10-CM

## 2020-02-05 DIAGNOSIS — Z29.9 ENCOUNTER FOR PROPHYLACTIC MEASURES, UNSPECIFIED: ICD-10-CM

## 2020-02-05 LAB
ANION GAP SERPL CALC-SCNC: 14 MMOL/L — SIGNIFICANT CHANGE UP (ref 5–17)
APPEARANCE UR: CLEAR — SIGNIFICANT CHANGE UP
BILIRUB UR-MCNC: NEGATIVE — SIGNIFICANT CHANGE UP
BLD GP AB SCN SERPL QL: SIGNIFICANT CHANGE UP
BUN SERPL-MCNC: 13 MG/DL — SIGNIFICANT CHANGE UP (ref 8–20)
CALCIUM SERPL-MCNC: 9.5 MG/DL — SIGNIFICANT CHANGE UP (ref 8.6–10.2)
CHLORIDE SERPL-SCNC: 99 MMOL/L — SIGNIFICANT CHANGE UP (ref 98–107)
CO2 SERPL-SCNC: 28 MMOL/L — SIGNIFICANT CHANGE UP (ref 22–29)
COLOR SPEC: YELLOW — SIGNIFICANT CHANGE UP
CREAT SERPL-MCNC: 0.58 MG/DL — SIGNIFICANT CHANGE UP (ref 0.5–1.3)
DIFF PNL FLD: ABNORMAL
EPI CELLS # UR: NEGATIVE — SIGNIFICANT CHANGE UP
GLUCOSE SERPL-MCNC: 90 MG/DL — SIGNIFICANT CHANGE UP (ref 70–99)
GLUCOSE UR QL: NEGATIVE MG/DL — SIGNIFICANT CHANGE UP
HCT VFR BLD CALC: 45.4 % — HIGH (ref 34.5–45)
HGB BLD-MCNC: 15.1 G/DL — SIGNIFICANT CHANGE UP (ref 11.5–15.5)
KETONES UR-MCNC: NEGATIVE — SIGNIFICANT CHANGE UP
LEUKOCYTE ESTERASE UR-ACNC: NEGATIVE — SIGNIFICANT CHANGE UP
MCHC RBC-ENTMCNC: 29.4 PG — SIGNIFICANT CHANGE UP (ref 27–34)
MCHC RBC-ENTMCNC: 33.3 GM/DL — SIGNIFICANT CHANGE UP (ref 32–36)
MCV RBC AUTO: 88.3 FL — SIGNIFICANT CHANGE UP (ref 80–100)
MRSA PCR RESULT.: SIGNIFICANT CHANGE UP
NITRITE UR-MCNC: NEGATIVE — SIGNIFICANT CHANGE UP
PH UR: 7 — SIGNIFICANT CHANGE UP (ref 5–8)
PLATELET # BLD AUTO: 175 K/UL — SIGNIFICANT CHANGE UP (ref 150–400)
POTASSIUM SERPL-MCNC: 4 MMOL/L — SIGNIFICANT CHANGE UP (ref 3.5–5.3)
POTASSIUM SERPL-SCNC: 4 MMOL/L — SIGNIFICANT CHANGE UP (ref 3.5–5.3)
PROT UR-MCNC: NEGATIVE MG/DL — SIGNIFICANT CHANGE UP
RBC # BLD: 5.14 M/UL — SIGNIFICANT CHANGE UP (ref 3.8–5.2)
RBC # FLD: 11.9 % — SIGNIFICANT CHANGE UP (ref 10.3–14.5)
RBC CASTS # UR COMP ASSIST: SIGNIFICANT CHANGE UP /HPF (ref 0–4)
S AUREUS DNA NOSE QL NAA+PROBE: DETECTED
SODIUM SERPL-SCNC: 141 MMOL/L — SIGNIFICANT CHANGE UP (ref 135–145)
SP GR SPEC: 1.01 — SIGNIFICANT CHANGE UP (ref 1.01–1.02)
UROBILINOGEN FLD QL: NEGATIVE MG/DL — SIGNIFICANT CHANGE UP
WBC # BLD: 7.84 K/UL — SIGNIFICANT CHANGE UP (ref 3.8–10.5)
WBC # FLD AUTO: 7.84 K/UL — SIGNIFICANT CHANGE UP (ref 3.8–10.5)
WBC UR QL: SIGNIFICANT CHANGE UP

## 2020-02-05 PROCEDURE — G0463: CPT

## 2020-02-05 PROCEDURE — 93010 ELECTROCARDIOGRAM REPORT: CPT

## 2020-02-05 PROCEDURE — 93005 ELECTROCARDIOGRAM TRACING: CPT

## 2020-02-05 RX ORDER — MUPIROCIN 20 MG/G
1 OINTMENT TOPICAL
Qty: 1 | Refills: 0
Start: 2020-02-05 | End: 2020-02-09

## 2020-02-05 NOTE — H&P PST ADULT - HISTORY OF PRESENT ILLNESS
59 year old female 59 year old female who is accompanied by daughter states that she has vaginal prolapse and has stress incontinence for the last few years, she also has a inguinal hernia on her left side for the last few years.

## 2020-02-05 NOTE — H&P PST ADULT - LAB RESULTS AND INTERPRETATION
2-5-20: MSSA+ pt informed and gave detailed instructions about the treatment, E- scribed meds to [facility] pharmacy. Dr. Esquivel's office informed(emailed Misti)

## 2020-02-05 NOTE — H&P PST ADULT - WEIGHT IN KG
Thoracic Surgery    Patient: Pool Talavera Date: 7/15/2019   : 1947 Attending: Curtis Ellis MD   72 year old male Room: /A     Post-op Day #: 5  Surgical Procedure:  robotic transhiatal esophagectomy with transcervical endoscopic esophageal mobilization and botox pyloroplasty, robotic cholecystectomy (Dr. Stevens)       Assessment/Plan:  S/p THE TEEM: Surgical pathology final, pT2pN0 (see below). Incisions stable. Ambulate every 4 hours during the day- allow patient to sleep at night if he has not refused therapy during the day. PT/OT/CR. ST for swallowing exercises/swallow eval. Tolerating full liquid diet and small pills. Video swallow today.   Hypoxemia: IS/CDB/OOB. Ambulate. Mucolytic. Flutter. Maintaining adequate saturations on RA.   History of PE / S/p IVC filter placement: Groin site stable. Eliquis resumed.   History of AFib: Resume home Amio  Hypertension: BB. Controlled.   CDK stage II: Creatinine WNL, u/o stable.   Post operative blood loss anemia: Expected. Monitor.   Acute post operative pain: Physical therapy. Zanaflex. Pain controlled  Left knee pain: chronic osteoarthritis. Has knee brace from home. PT/OT  Constipation: resolved. Metamucil prn. Patient refusing colace.     Advance to soft diet  Tubi  to BLE   Re-iterated with patient- will allow to sleep at night provided he works with therapy throughout the day. He is agreeable to this.  Discharge teaching completed- will need reinforcement prior to discharge   Anticipate DC home tomorrow      Discharge Disposition: Home     Subjective: Waiting to go for a walk- feels depressed and frustrated here, not happy with limited foods on full liquid diet- wants to go home    Discussed DC home later today- patient needs friend to transport him home, friend not available today       Vital 24 Hour Range Last Value   Temperature Temp  Min: 98 °F (36.7 °C)  Max: 98.4 °F (36.9 °C) 98 °F (36.7 °C) (07/15/19 0830)   Pulse Pulse  Min: 87   Max: 98 94 (07/15/19 1406)   Respiratory Resp  Min: 18  Max: 20 18 (07/15/19 1406)   Non-Invasive  Blood Pressure BP  Min: 104/60  Max: 123/66 104/60 (07/15/19 1406)   Pulse Oximetry SpO2  Min: 93 %  Max: 99 % 98 % (07/15/19 1406)     Oxygen: RA    Weight over the past 48 Hours:  Patient Vitals for the past 48 hrs:   Weight   07/14/19 0658 125.1 kg   07/15/19 0430 122.3 kg      Admit Weight:   Weight: 125.6 kg (07/09/19 1725)  BMI:   BMI (Calculated): 34.62 (07/09/19 1725)    Intake/Output:    Last Stool Occurrence: 1(large bm) (07/15/19 0546)  I/O this shift:  In: -   Out: 520 [Urine:520]  I/O last 3 completed shifts:  In: 180 [P.O.:180]  Out: 1575 [Urine:1575]      Intake/Output Summary (Last 24 hours) at 7/15/2019 1410  Last data filed at 7/15/2019 1405  Gross per 24 hour   Intake 0 ml   Output 1545 ml   Net -1545 ml       Diet: Full Liquids    Activity: Ambulate every 4 hours during the day and up to chair for all meals.     Rhythm: Sinus rhythm    Physical Exam:   Heart: Regular rate and rhythm and S1, S2 normal  Lungs: Diminished breath sounds  bibasilar  Abdomen: semi firm, non tender, +BM   Incision(s): abdominal port incisions x 5 CDI. Midline abdominal incision CDI. Left neck CDI  Neurologic: Grossly normal, Alert and oriented to person, place and time and hand grasp weak but equal bilaterally   Extremities: edema +1/2 BLE and pulses DP + 1 equal bilaterally   Good cough, stable voice    Laboratory Results:    Recent Labs   Lab 07/15/19  0620 07/14/19  1424 07/14/19  0750 07/13/19  1530 07/13/19  0430  07/12/19  0750  07/10/19  1855  07/09/19  2054   SODIUM 141  --  138  --  141  --   --    < >  --    < > 144   POTASSIUM 3.5 3.4 3.6  --  4.0   < > 3.6   < >  --    < > 3.8   CHLORIDE 108*  --  106  --  108*  --   --    < >  --    < > 111*   CO2 28  --  26  --  26  --   --    < >  --    < > 28   BUN 13  --  17  --  19  --   --    < >  --    < > 14   CREATININE 0.95  --  0.99  --  1.14  --   --    < >  --     < > 1.09   GLUCOSE 96  --  105*  --  100*  --   --    < >  --    < > 96   MG  --   --   --   --  1.8  --  1.6*  --  1.9   < >  --    WBC 4.0*  --  3.3*  --  2.7*  --   --    < >  --    < > 3.6*   HGB 7.4*  --  7.2* 7.2* 6.7*  --   --    < >  --    < > 7.4*   HCT 23.7*  --  23.3*  --  23.3*  --   --    < >  --    < > 24.3*   *  --  114*  --  102*  --   --    < >  --    < > 142   PT  --   --   --   --   --   --   --   --   --   --  11.2   INR  --   --   --   --   --   --   --   --   --   --  1.1    < > = values in this interval not displayed.       Pathology/Cultures: Reviewed  A: Gallbladder, resection:     - Active chronic cholecystitis with areas of mucosal ulceration.     - Negative for malignancy.         B: Esophagus, proximal margin, biopsy/excised small segment:     - Squamous lined esophagus with no evidence of dysplasia or malignancy.         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     - All margins of resection are free of malignancy.     - 16 lymph nodes with no evidence of malignancy (ypN0).     - Gastric tissues at the distal aspect of the specimen show mild to moderate     chronic gastritis, Helicobacter stain is negative.         Diagnosis Comment:     ESOPHAGUS     SPECIMEN          Procedure:   Esophagectomy     TUMOR          Tumor Site:   Distal esophagus (low thoracic esophagus) and extending to     involve esophagogastric junction (EGJ)     Relationship of Tumor to Esophagogastric Junction:   Tumor midpoint lies in     the distal esophagus AND tumor involves the esophagogastric junction     Distance of Tumor Center from Esophagogastric Junction in Centimeters (cm):       1.3 Centimeters (cm) proximal to the GE junction     Histologic Type:    Adenocarcinoma     Histologic Grade:   G2:  Moderately differentiated     Tumor Size:   4.3 Centimeters (cm)     Tumor Extent          Tumor Extension:   Tumor invades the muscularis propria     Accessory Findings          Treatment Effect:   Present     Lymphovascular Invasion:   Not identified     MARGINS          Margins:   All margins are uninvolved by invasive carcinoma, dysplasia,     and intestinal metaplasia          Margins Examined:   Proximal, Distal, Radial     LYMPH NODES          Number of Lymph Nodes Involved:   0     Number of Lymph Nodes Examined:   16     PATHOLOGIC STAGE CLASSIFICATION (pTNM, AJCC 8th Edition)          TNM Descriptors:   y (post-treatment)     Primary Tumor (pT):   pT2: Tumor invades the muscularis propria     Regional Lymph Nodes (pN):   pN0: No regional lymph node metastasis        Sandor Olmstead M.D.    Chest X-Ray: Reviewed      Medications/Infusions:  Scheduled:   • famotidine  20 mg Oral 2 times per day   • metoPROLOL tartrate  12.5 mg Oral 2 times per day   • simethicone  80 mg Oral PCHS   • apixaBAN  5 mg Oral 2 times per day   • levothyroxine  88 mcg Oral QAM AC   • iron sucrose (VENOFER) injection/IVPB  200 mg Intravenous Daily   • furosemide  20 mg Oral BID   • lidocaine  1 patch Transdermal Daily   • bisacodyl  10 mg Rectal Once   • docusate sodium  100 mg Oral BID   • guaifenesin  200 mg Oral 2 times per day   • lidocaine  1 patch Transdermal Daily   • metoCLOPramide  5 mg Intravenous Q6H   • albuterol-ipratropium 2.5 mg/0.5 mg  3 mL Nebulization Q6H Resp       Continuous Infusions:   • sodium chloride 0.9% infusion     • sodium chloride 0.9% infusion 10 mL/hr at 07/12/19 0800   • sodium chloride 0.9% infusion Stopped (07/11/19 1231)       Discussed with or notes reviewed:  Patient, RN and MD      Thoracic Surgery  Sue Riedel APNP [phone]   61

## 2020-02-05 NOTE — H&P PST ADULT - ASSESSMENT
CAPRINI VTE 2.0 SCORE [CLOT updated 2019]    AGE RELATED RISK FACTORS                                                       MOBILITY RELATED FACTORS  [ ] Age 41-60 years                                            (1 Point)                    [ ] Bed rest                                                        (1 Point)  [ x] Age: 61-74 years                                           (2 Points)                  [ ] Plaster cast                                                   (2 Points)  [ ] Age= 75 years                                              (3 Points)                    [ ] Bed bound for more than 72 hours                 (2 Points)    DISEASE RELATED RISK FACTORS                                               GENDER SPECIFIC FACTORS  [ ] Edema in the lower extremities                       (1 Point)              [ ] Pregnancy                                                     (1 Point)  [ ] Varicose veins                                               (1 Point)                     [ ] Post-partum < 6 weeks                                   (1 Point)             [x ] BMI > 25 Kg/m2                                            (1 Point)                     [ ] Hormonal therapy  or oral contraception          (1 Point)                 [ ] Sepsis (in the previous month)                        (1 Point)               [ ] History of pregnancy complications                 (1 point)  [ ] Pneumonia or serious lung disease                                               [ ] Unexplained or recurrent                     (1 Point)           (in the previous month)                               (1 Point)  [ ] Abnormal pulmonary function test                     (1 Point)                 SURGERY RELATED RISK FACTORS  [ ] Acute myocardial infarction                              (1 Point)               [ ]  Section                                             (1 Point)  [ ] Congestive heart failure (in the previous month)  (1 Point)      [ ] Minor surgery                                                  (1 Point)   [ ] Inflammatory bowel disease                             (1 Point)               [ ] Arthroscopic surgery                                        (2 Points)  [ ] Central venous access                                      (2 Points)                [x ] General surgery lasting more than 45 minutes (2 points)  [ ] Malignancy- Present or previous                   (2 Points)                [ ] Elective arthroplasty                                         (5 points)    [ ] Stroke (in the previous month)                          (5 Points)                                                                                                                                                           HEMATOLOGY RELATED FACTORS                                                 TRAUMA RELATED RISK FACTORS  [ ] Prior episodes of VTE                                     (3 Points)                [ ] Fracture of the hip, pelvis, or leg                       (5 Points)  [ ] Positive family history for VTE                         (3 Points)             [ ] Acute spinal cord injury (in the previous month)  (5 Points)  [ ] Prothrombin 29631 A                                     (3 Points)               [ ] Paralysis  (less than 1 month)                             (5 Points)  [ ] Factor V Leiden                                             (3 Points)                  [ ] Multiple Trauma within 1 month                        (5 Points)  [ ] Lupus anticoagulants                                     (3 Points)                                                           [ ] Anticardiolipin antibodies                               (3 Points)                                                       [ ] High homocysteine in the blood                      (3 Points)                                             [ ] Other congenital or acquired thrombophilia      (3 Points)                                                [ ] Heparin induced thrombocytopenia                  (3 Points)                                     Total Score [    5      ]    OPIOID RISK TOOL    ANNAILSA EACH BOX THAT APPLIES AND ADD TOTALS AT THE END    FAMILY HISTORY OF SUBSTANCE ABUSE                 FEMALE         MALE                                                Alcohol                             [  ]1 pt          [  ]3pts                                               Illegal Drugs                     [  ]2 pts        [  ]3pts                                               Rx Drugs                           [  ]4 pts        [  ]4 pts    PERSONAL HISTORY OF SUBSTANCE ABUSE                                                                                          Alcohol                             [  ]3 pts       [  ]3 pts                                               Illegal Drugs                     [  ]4 pts        [  ]4 pts                                               Rx Drugs                           [  ]5 pts        [  ]5 pts    AGE BETWEEN 16-45 YEARS                                      [  ]1 pt         [  ]1 pt    HISTORY OF PREADOLESCENT   SEXUAL ABUSE                                                             [  ]3 pts        [  ]0pts    PSYCHOLOGICAL DISEASE                     ADD, OCD, Bipolar, Schizophrenia        [  ]2 pts         [  ]2 pts                      Depression                                               [  ]1 pt           [  ]1 pt           SCORING TOTAL   (add numbers and type here)              ( 0)                                     A score of 3 or lower indicated LOW risk for future opioid abuse  A score of 4 to 7 indicated moderate risk for future opioid abuse  A score of 8 or higher indicates a high risk for opioid abuse

## 2020-02-07 LAB
CULTURE RESULTS: SIGNIFICANT CHANGE UP
SPECIMEN SOURCE: SIGNIFICANT CHANGE UP

## 2020-02-12 PROBLEM — Z78.9 OTHER SPECIFIED HEALTH STATUS: Chronic | Status: ACTIVE | Noted: 2020-02-05

## 2020-02-19 ENCOUNTER — APPOINTMENT (OUTPATIENT)
Dept: SURGERY | Facility: HOSPITAL | Age: 60
End: 2020-02-19

## 2020-02-19 ENCOUNTER — INPATIENT (INPATIENT)
Facility: HOSPITAL | Age: 60
LOS: 0 days | Discharge: HOME | DRG: 337 | End: 2020-02-20
Attending: STUDENT IN AN ORGANIZED HEALTH CARE EDUCATION/TRAINING PROGRAM | Admitting: STUDENT IN AN ORGANIZED HEALTH CARE EDUCATION/TRAINING PROGRAM
Payer: COMMERCIAL

## 2020-02-19 ENCOUNTER — RESULT REVIEW (OUTPATIENT)
Age: 60
End: 2020-02-19

## 2020-02-19 ENCOUNTER — APPOINTMENT (OUTPATIENT)
Age: 60
End: 2020-02-19
Payer: COMMERCIAL

## 2020-02-19 VITALS
SYSTOLIC BLOOD PRESSURE: 120 MMHG | DIASTOLIC BLOOD PRESSURE: 64 MMHG | RESPIRATION RATE: 20 BRPM | HEART RATE: 86 BPM | TEMPERATURE: 99 F | HEIGHT: 61 IN | OXYGEN SATURATION: 97 % | WEIGHT: 134.48 LBS

## 2020-02-19 DIAGNOSIS — Z90.710 ACQUIRED ABSENCE OF BOTH CERVIX AND UTERUS: Chronic | ICD-10-CM

## 2020-02-19 DIAGNOSIS — Z98.890 OTHER SPECIFIED POSTPROCEDURAL STATES: ICD-10-CM

## 2020-02-19 DIAGNOSIS — K40.90 UNILATERAL INGUINAL HERNIA, WITHOUT OBSTRUCTION OR GANGRENE, NOT SPECIFIED AS RECURRENT: ICD-10-CM

## 2020-02-19 LAB — ABO RH CONFIRMATION: SIGNIFICANT CHANGE UP

## 2020-02-19 PROCEDURE — 88300 SURGICAL PATH GROSS: CPT | Mod: 26

## 2020-02-19 PROCEDURE — 49650 LAP ING HERNIA REPAIR INIT: CPT | Mod: 80

## 2020-02-19 PROCEDURE — 49650 LAP ING HERNIA REPAIR INIT: CPT

## 2020-02-19 PROCEDURE — S2900 ROBOTIC SURGICAL SYSTEM: CPT | Mod: NC

## 2020-02-19 PROCEDURE — 57288 REPAIR BLADDER DEFECT: CPT

## 2020-02-19 PROCEDURE — 57425 LAPAROSCOPY SURG COLPOPEXY: CPT | Mod: AS

## 2020-02-19 PROCEDURE — 57425 LAPAROSCOPY SURG COLPOPEXY: CPT

## 2020-02-19 PROCEDURE — 57288 REPAIR BLADDER DEFECT: CPT | Mod: AS

## 2020-02-19 RX ORDER — KETOROLAC TROMETHAMINE 30 MG/ML
30 SYRINGE (ML) INJECTION EVERY 8 HOURS
Refills: 0 | Status: DISCONTINUED | OUTPATIENT
Start: 2020-02-19 | End: 2020-02-20

## 2020-02-19 RX ORDER — HYDROMORPHONE HYDROCHLORIDE 2 MG/ML
0.5 INJECTION INTRAMUSCULAR; INTRAVENOUS; SUBCUTANEOUS
Refills: 0 | Status: DISCONTINUED | OUTPATIENT
Start: 2020-02-19 | End: 2020-02-19

## 2020-02-19 RX ORDER — CEFAZOLIN SODIUM 1 G
1000 VIAL (EA) INJECTION ONCE
Refills: 0 | Status: COMPLETED | OUTPATIENT
Start: 2020-02-19 | End: 2020-02-19

## 2020-02-19 RX ORDER — SODIUM CHLORIDE 9 MG/ML
3 INJECTION INTRAMUSCULAR; INTRAVENOUS; SUBCUTANEOUS EVERY 8 HOURS
Refills: 0 | Status: DISCONTINUED | OUTPATIENT
Start: 2020-02-19 | End: 2020-02-19

## 2020-02-19 RX ORDER — OXYCODONE AND ACETAMINOPHEN 5; 325 MG/1; MG/1
2 TABLET ORAL EVERY 6 HOURS
Refills: 0 | Status: DISCONTINUED | OUTPATIENT
Start: 2020-02-19 | End: 2020-02-20

## 2020-02-19 RX ORDER — ACETAMINOPHEN 500 MG
975 TABLET ORAL ONCE
Refills: 0 | Status: COMPLETED | OUTPATIENT
Start: 2020-02-19 | End: 2020-02-19

## 2020-02-19 RX ORDER — OXYCODONE HYDROCHLORIDE 5 MG/1
5 TABLET ORAL ONCE
Refills: 0 | Status: DISCONTINUED | OUTPATIENT
Start: 2020-02-19 | End: 2020-02-19

## 2020-02-19 RX ORDER — ONDANSETRON 8 MG/1
4 TABLET, FILM COATED ORAL ONCE
Refills: 0 | Status: DISCONTINUED | OUTPATIENT
Start: 2020-02-19 | End: 2020-02-19

## 2020-02-19 RX ORDER — OXYCODONE AND ACETAMINOPHEN 5; 325 MG/1; MG/1
1 TABLET ORAL EVERY 4 HOURS
Refills: 0 | Status: DISCONTINUED | OUTPATIENT
Start: 2020-02-19 | End: 2020-02-20

## 2020-02-19 RX ORDER — CEFAZOLIN SODIUM 1 G
2000 VIAL (EA) INJECTION ONCE
Refills: 0 | Status: COMPLETED | OUTPATIENT
Start: 2020-02-19 | End: 2020-02-19

## 2020-02-19 RX ORDER — ONDANSETRON 8 MG/1
4 TABLET, FILM COATED ORAL EVERY 6 HOURS
Refills: 0 | Status: DISCONTINUED | OUTPATIENT
Start: 2020-02-19 | End: 2020-02-20

## 2020-02-19 RX ORDER — ENOXAPARIN SODIUM 100 MG/ML
40 INJECTION SUBCUTANEOUS ONCE
Refills: 0 | Status: COMPLETED | OUTPATIENT
Start: 2020-02-19 | End: 2020-02-19

## 2020-02-19 RX ORDER — OFLOXACIN 0.3 %
1 DROPS OPHTHALMIC (EYE) THREE TIMES A DAY
Refills: 0 | Status: DISCONTINUED | OUTPATIENT
Start: 2020-02-19 | End: 2020-02-20

## 2020-02-19 RX ORDER — CEFAZOLIN SODIUM 1 G
VIAL (EA) INJECTION
Refills: 0 | Status: COMPLETED | OUTPATIENT
Start: 2020-02-19 | End: 2020-02-20

## 2020-02-19 RX ORDER — CEFAZOLIN SODIUM 1 G
1000 VIAL (EA) INJECTION EVERY 8 HOURS
Refills: 0 | Status: COMPLETED | OUTPATIENT
Start: 2020-02-20 | End: 2020-02-20

## 2020-02-19 RX ORDER — FENTANYL CITRATE 50 UG/ML
25 INJECTION INTRAVENOUS
Refills: 0 | Status: DISCONTINUED | OUTPATIENT
Start: 2020-02-19 | End: 2020-02-19

## 2020-02-19 RX ORDER — SODIUM CHLORIDE 9 MG/ML
1000 INJECTION, SOLUTION INTRAVENOUS
Refills: 0 | Status: DISCONTINUED | OUTPATIENT
Start: 2020-02-19 | End: 2020-02-20

## 2020-02-19 RX ADMIN — Medication 975 MILLIGRAM(S): at 23:30

## 2020-02-19 RX ADMIN — Medication 100 MILLIGRAM(S): at 23:01

## 2020-02-19 RX ADMIN — HYDROMORPHONE HYDROCHLORIDE 0.5 MILLIGRAM(S): 2 INJECTION INTRAMUSCULAR; INTRAVENOUS; SUBCUTANEOUS at 14:17

## 2020-02-19 RX ADMIN — Medication 100 MILLIGRAM(S): at 12:30

## 2020-02-19 RX ADMIN — Medication 975 MILLIGRAM(S): at 22:57

## 2020-02-19 RX ADMIN — ENOXAPARIN SODIUM 40 MILLIGRAM(S): 100 INJECTION SUBCUTANEOUS at 22:30

## 2020-02-19 RX ADMIN — HYDROMORPHONE HYDROCHLORIDE 0.5 MILLIGRAM(S): 2 INJECTION INTRAMUSCULAR; INTRAVENOUS; SUBCUTANEOUS at 14:18

## 2020-02-19 RX ADMIN — Medication 1 DROP(S): at 15:42

## 2020-02-19 NOTE — BRIEF OPERATIVE NOTE - OPERATION/FINDINGS
Stage IV POP, absent uterus.  Following procedure excellent suspension with cystoscopy performed and bladder intact no suture no mesh.    Please see separate operative note for left inguinal hernia repair with mesh by general surgery

## 2020-02-19 NOTE — BRIEF OPERATIVE NOTE - NSICDXBRIEFPROCEDURE_GEN_ALL_CORE_FT
PROCEDURES:  Insertion, midurethral sling, polypropylene, using single incision 19-Feb-2020 13:23:16  Renae Sadler  Sacrocolpopexy, robot-assisted 19-Feb-2020 13:23:08  Renae Sadler

## 2020-02-19 NOTE — PROGRESS NOTE ADULT - SUBJECTIVE AND OBJECTIVE BOX
60yo POD#0 s/p robotic sacrocolpopexy and hernia repair w/ mesh by General Surgery, sling, cysto    S: Patient was seen and examined at bedside--doing well postop  She complains of feeling like sand is in her eye. She says the drops ordered for her have been helping. Per EMR, she is on ofloxacin eye drops by anesthesia for corneal abrasion   Pain is otherwise controlled.   Tolerating regular diet, denies N/V.   Lombardi in place.    O:   T(C): 36.9 (02-19-20 @ 19:19), Max: 37.2 (02-19-20 @ 06:48)  HR: 82 (02-19-20 @ 19:19) (67 - 87)  BP: 130/67 (02-19-20 @ 19:19) (119/68 - 152/79)  RR: 18 (02-19-20 @ 19:19) (9 - 20)  SpO2: 97% (02-19-20 @ 19:19) (97% - 100%)    CV: Regular rate and rhythm  Lungs: CTAB  Abdomen: soft, nontender, +BS   Incision: dressing removed, clean dry and intact  Ext: no edema, erythema or pain    Complete Blood Count (02.05.20 @ 08:48)    WBC Count: 7.84 K/uL    RBC Count: 5.14 M/uL    Hemoglobin: 15.1 g/dL    Hematocrit: 45.4 %    Mean Cell Volume: 88.3 fl    Mean Cell Hemoglobin: 29.4 pg    Mean Cell Hemoglobin Conc: 33.3 gm/dL    Red Cell Distrib Width: 11.9 %    Platelet Count - Automated: 175 K/uL

## 2020-02-19 NOTE — PROGRESS NOTE ADULT - ASSESSMENT
A/P   60yo POD#0 s/p robotic sacrocolpopexy and hernia repair w/ mesh by General Surgery, sling, cysto  - Continue routine post operative care  - CV: No concerns  - Pulm: Incentive spirometer at bedside, encourage ambulation  - GI: tolerating regular diet, demonstrating bowel function  - : charles in place, TOV in AM, f/u AM BMP  - Gyn: Minimal vaginal bleeding  - Heme: Hgb 15.1 -> f/u postop CBC  - ID: WBC 7.84 -> f/u postop CBC  - DVT: Lovenox  - Neuro/Psych: No concerns  - Dispo: Continue inpatient care, d/c tomorrow pending TOV and attending approval

## 2020-02-19 NOTE — BRIEF OPERATIVE NOTE - NSICDXBRIEFPREOP_GEN_ALL_CORE_FT
PRE-OP DIAGNOSIS:  Urinary, incontinence, stress female 19-Feb-2020 13:24:05  Renae Sadler  Cystocele and rectocele with complete uterovaginal prolapse 19-Feb-2020 13:23:56  Renae Sadler

## 2020-02-20 ENCOUNTER — TRANSCRIPTION ENCOUNTER (OUTPATIENT)
Age: 60
End: 2020-02-20

## 2020-02-20 VITALS
DIASTOLIC BLOOD PRESSURE: 63 MMHG | SYSTOLIC BLOOD PRESSURE: 117 MMHG | HEART RATE: 72 BPM | RESPIRATION RATE: 18 BRPM | OXYGEN SATURATION: 99 % | TEMPERATURE: 99 F

## 2020-02-20 LAB
ANION GAP SERPL CALC-SCNC: 11 MMOL/L — SIGNIFICANT CHANGE UP (ref 5–17)
BASOPHILS # BLD AUTO: 0.02 K/UL — SIGNIFICANT CHANGE UP (ref 0–0.2)
BASOPHILS NFR BLD AUTO: 0.2 % — SIGNIFICANT CHANGE UP (ref 0–2)
BUN SERPL-MCNC: 13 MG/DL — SIGNIFICANT CHANGE UP (ref 8–20)
CALCIUM SERPL-MCNC: 8.2 MG/DL — LOW (ref 8.6–10.2)
CHLORIDE SERPL-SCNC: 103 MMOL/L — SIGNIFICANT CHANGE UP (ref 98–107)
CO2 SERPL-SCNC: 23 MMOL/L — SIGNIFICANT CHANGE UP (ref 22–29)
CREAT SERPL-MCNC: 0.65 MG/DL — SIGNIFICANT CHANGE UP (ref 0.5–1.3)
EOSINOPHIL # BLD AUTO: 0.01 K/UL — SIGNIFICANT CHANGE UP (ref 0–0.5)
EOSINOPHIL NFR BLD AUTO: 0.1 % — SIGNIFICANT CHANGE UP (ref 0–6)
GLUCOSE SERPL-MCNC: 104 MG/DL — HIGH (ref 70–99)
HCT VFR BLD CALC: 35.1 % — SIGNIFICANT CHANGE UP (ref 34.5–45)
HGB BLD-MCNC: 12.1 G/DL — SIGNIFICANT CHANGE UP (ref 11.5–15.5)
IMM GRANULOCYTES NFR BLD AUTO: 0.6 % — SIGNIFICANT CHANGE UP (ref 0–1.5)
LYMPHOCYTES # BLD AUTO: 1.96 K/UL — SIGNIFICANT CHANGE UP (ref 1–3.3)
LYMPHOCYTES # BLD AUTO: 15.5 % — SIGNIFICANT CHANGE UP (ref 13–44)
MCHC RBC-ENTMCNC: 30.3 PG — SIGNIFICANT CHANGE UP (ref 27–34)
MCHC RBC-ENTMCNC: 34.5 GM/DL — SIGNIFICANT CHANGE UP (ref 32–36)
MCV RBC AUTO: 87.8 FL — SIGNIFICANT CHANGE UP (ref 80–100)
MONOCYTES # BLD AUTO: 1.15 K/UL — HIGH (ref 0–0.9)
MONOCYTES NFR BLD AUTO: 9.1 % — SIGNIFICANT CHANGE UP (ref 2–14)
NEUTROPHILS # BLD AUTO: 9.47 K/UL — HIGH (ref 1.8–7.4)
NEUTROPHILS NFR BLD AUTO: 74.5 % — SIGNIFICANT CHANGE UP (ref 43–77)
PLATELET # BLD AUTO: 151 K/UL — SIGNIFICANT CHANGE UP (ref 150–400)
POTASSIUM SERPL-MCNC: 4 MMOL/L — SIGNIFICANT CHANGE UP (ref 3.5–5.3)
POTASSIUM SERPL-SCNC: 4 MMOL/L — SIGNIFICANT CHANGE UP (ref 3.5–5.3)
RBC # BLD: 4 M/UL — SIGNIFICANT CHANGE UP (ref 3.8–5.2)
RBC # FLD: 12.2 % — SIGNIFICANT CHANGE UP (ref 10.3–14.5)
SODIUM SERPL-SCNC: 137 MMOL/L — SIGNIFICANT CHANGE UP (ref 135–145)
WBC # BLD: 12.68 K/UL — HIGH (ref 3.8–10.5)
WBC # FLD AUTO: 12.68 K/UL — HIGH (ref 3.8–10.5)

## 2020-02-20 PROCEDURE — S2900: CPT

## 2020-02-20 PROCEDURE — C1771: CPT

## 2020-02-20 PROCEDURE — 88300 SURGICAL PATH GROSS: CPT

## 2020-02-20 PROCEDURE — 36415 COLL VENOUS BLD VENIPUNCTURE: CPT

## 2020-02-20 PROCEDURE — 80048 BASIC METABOLIC PNL TOTAL CA: CPT

## 2020-02-20 PROCEDURE — C1781: CPT

## 2020-02-20 PROCEDURE — 85027 COMPLETE CBC AUTOMATED: CPT

## 2020-02-20 RX ADMIN — Medication 100 MILLIGRAM(S): at 06:31

## 2020-02-20 RX ADMIN — Medication 1 DROP(S): at 06:31

## 2020-02-20 NOTE — DISCHARGE NOTE PROVIDER - PROVIDER TOKENS
PROVIDER:[TOKEN:[[id number]],SCHEDULEDAPPT:[03/05/2020],SCHEDULEDAPPTTIME:[02:45 PM],ESTABLISHEDPATIENT:[T]]

## 2020-02-20 NOTE — DISCHARGE NOTE PROVIDER - NSDCMRMEDTOKEN_GEN_ALL_CORE_FT
mupirocin 2% topical ointment: 1 application in each nostril 2 times a day   oxycodone-acetaminophen 5 mg-325 mg oral tablet: 1 tab(s) orally every 4 hours, As needed, Moderate Pain (4 - 6)  oxycodone-acetaminophen 5 mg-325 mg oral tablet: 2 tab(s) orally every 6 hours, As needed, Severe Pain (7 - 10) mupirocin 2% topical ointment: 1 application in each nostril 2 times a day   oxycodone-acetaminophen 5 mg-325 mg oral tablet: 2 tab(s) orally every 6 hours, As needed, Severe Pain (7 - 10)  oxycodone-acetaminophen 5 mg-325 mg oral tablet: 2 tab(s) orally every 6 hours, As needed, Severe Pain (7 - 10) MDD:4 tablets

## 2020-02-20 NOTE — DISCHARGE NOTE PROVIDER - NSDCCPCAREPLAN_GEN_ALL_CORE_FT
PRINCIPAL DISCHARGE DIAGNOSIS  Diagnosis: Vaginal wall prolapse  Assessment and Plan of Treatment: pod #1 scp      SECONDARY DISCHARGE DIAGNOSES  Diagnosis: Urinary, incontinence, stress female  Assessment and Plan of Treatment: vaginal sling and cysto

## 2020-02-20 NOTE — PROGRESS NOTE ADULT - ASSESSMENT
A/P   58yo POD#1 s/p robotic sacrocolpopexy and hernia repair w/ mesh by General Surgery, sling, cysto  - Continue routine post operative care  - CV: No concerns  - Pulm: Incentive spirometer at bedside, encourage ambulation  - GI: tolerating regular diet, demonstrating bowel function  - : Passed TOV, voiding spontaneously, f/u postop BMP  - Gyn: Minimal vaginal bleeding  - Heme: Hgb 15.1 -> f/u postop CBC  - ID: WBC 7.84 -> f/u postop CBC  - DVT: Lovenox  - Neuro/Psych: No concerns  - Dispo: Home today after attending rounds

## 2020-02-20 NOTE — DISCHARGE NOTE PROVIDER - HOSPITAL COURSE
58y/o female post sanaz , scp, sling and cystoscopy with Dr. Mauricio, left inguinal hernia repair with General Surgery. pt post op day #1 no overnight events passed trial of void .

## 2020-02-20 NOTE — PROGRESS NOTE ADULT - SUBJECTIVE AND OBJECTIVE BOX
60yo POD#1 s/p robotic sacrocolpopexy and hernia repair w/ mesh by General Surgery, sling, cysto    S: Patient was seen and examined at bedside--doing well postop  Her eye is feeling better with the eyedrops  Pain is otherwise controlled.   Tolerating regular diet, denies N/V.   Lombardi catheter removed, passed TOV (250cc in, 300cc out)    O:   Vital Signs Last 24 Hrs  T(C): 36.9 (20 Feb 2020 04:42), Max: 37 (19 Feb 2020 13:27)  T(F): 98.5 (20 Feb 2020 04:42), Max: 98.6 (19 Feb 2020 13:27)  HR: 88 (20 Feb 2020 04:42) (67 - 88)  BP: 110/62 (20 Feb 2020 04:42) (109/65 - 152/79)  RR: 16 (20 Feb 2020 04:42) (9 - 19)  SpO2: 96% (20 Feb 2020 04:42) (96% - 100%)    CV: Regular rate and rhythm  Lungs: CTAB  Abdomen: soft, nontender, +BS   Incision: port sites clean, dry, intact  Ext: no edema, erythema or pain    Complete Blood Count (02.05.20 @ 08:48)    WBC Count: 7.84 K/uL    RBC Count: 5.14 M/uL    Hemoglobin: 15.1 g/dL    Hematocrit: 45.4 %    Mean Cell Volume: 88.3 fl    Mean Cell Hemoglobin: 29.4 pg    Mean Cell Hemoglobin Conc: 33.3 gm/dL    Red Cell Distrib Width: 11.9 %    Platelet Count - Automated: 175 K/uL

## 2020-02-20 NOTE — PROGRESS NOTE ADULT - ATTENDING COMMENTS
pt seen and examined  feels well, tolerating diet, passed TOV  VSS, labs wnl  exam benign, no CVA tenderness    plan for d/c home, precautions reviewed, all questions answered

## 2020-02-20 NOTE — DISCHARGE NOTE PROVIDER - CARE PROVIDER_API CALL
Priti Chiang)  Obstetrics and Gynecology  [address] [place] [address]  Phone: [phone]  Fax: [phone]  Established Patient  Scheduled Appointment: 03/05/2020 02:45 PM

## 2020-02-20 NOTE — DISCHARGE NOTE PROVIDER - NSDCFUSCHEDAPPT_GEN_ALL_CORE_FT
[facility] ; [facility] ; [facility]PP [facility] [address] [facility] ; 04/02/2020 ; NPP [facility] [address] [facility] [address] [facility] ; 03/05/2020 ; [facility] [address] [facility] ; 04/02/2020 ; NPP [facility] [address] [address]

## 2020-02-21 RX ORDER — OXYCODONE AND ACETAMINOPHEN 5; 325 MG/1; MG/1
5-325 TABLET ORAL
Qty: 10 | Refills: 0 | Status: ACTIVE | COMMUNITY
Start: 2020-02-21 | End: 1900-01-01

## 2020-03-05 ENCOUNTER — APPOINTMENT (OUTPATIENT)
Dept: UROGYNECOLOGY | Facility: CLINIC | Age: 60
End: 2020-03-05
Payer: COMMERCIAL

## 2020-03-05 VITALS
DIASTOLIC BLOOD PRESSURE: 75 MMHG | HEIGHT: 61.5 IN | BODY MASS INDEX: 25.53 KG/M2 | SYSTOLIC BLOOD PRESSURE: 133 MMHG | WEIGHT: 137 LBS

## 2020-03-05 LAB
BILIRUB UR QL STRIP: NEGATIVE
GLUCOSE UR-MCNC: NEGATIVE
HCG UR QL: 0.2 EU/DL
HGB UR QL STRIP.AUTO: NORMAL
KETONES UR-MCNC: NEGATIVE
LEUKOCYTE ESTERASE UR QL STRIP: NORMAL
NITRITE UR QL STRIP: NEGATIVE
PH UR STRIP: 5.5
PROT UR STRIP-MCNC: NEGATIVE
SP GR UR STRIP: 1.02

## 2020-03-05 PROCEDURE — 99024 POSTOP FOLLOW-UP VISIT: CPT

## 2020-03-05 PROCEDURE — 81003 URINALYSIS AUTO W/O SCOPE: CPT | Mod: NC,QW

## 2020-03-05 NOTE — HISTORY OF PRESENT ILLNESS
[FreeTextEntry1] : \par dilcia's daughter translated\par \dashawn Juan is 2 wks s/p robotic reconstruction, sling, hernia repair\par very happy, no leakage of urine, no pain, occasional pulling left groin, no constipation

## 2020-03-05 NOTE — DISCUSSION/SUMMARY
[FreeTextEntry1] : \par 2 wks s/p robotic reconstruction, doing well, no complaints, very happy, reviewed restrictions, return in 4 wks, all questions were answered.

## 2020-03-05 NOTE — PHYSICAL EXAM
[No Acute Distress] : in no acute distress [Well developed] : well developed [Well Nourished] : ~L well nourished [Tenderness] : ~T no ~M abdominal tenderness observed [Distended] : not distended [Normal] : normal external genitalia [Normal Appearance] : general appearance was normal [FreeTextEntry4] : no mesh exposure, excellent support

## 2020-04-02 ENCOUNTER — APPOINTMENT (OUTPATIENT)
Dept: UROGYNECOLOGY | Facility: CLINIC | Age: 60
End: 2020-04-02
Payer: COMMERCIAL

## 2020-04-02 DIAGNOSIS — G89.18 OTHER ACUTE POSTPROCEDURAL PAIN: ICD-10-CM

## 2020-04-02 LAB
BILIRUB UR QL STRIP: NEGATIVE
GLUCOSE UR-MCNC: NEGATIVE
HCG UR QL: 0.2 EU/DL
HGB UR QL STRIP.AUTO: NEGATIVE
KETONES UR-MCNC: NORMAL
LEUKOCYTE ESTERASE UR QL STRIP: NORMAL
NITRITE UR QL STRIP: NEGATIVE
PH UR STRIP: 6
PROT UR STRIP-MCNC: 30
SP GR UR STRIP: 1.02

## 2020-04-02 PROCEDURE — 99024 POSTOP FOLLOW-UP VISIT: CPT

## 2020-04-02 PROCEDURE — 81003 URINALYSIS AUTO W/O SCOPE: CPT | Mod: NC,QW

## 2020-04-02 NOTE — HISTORY OF PRESENT ILLNESS
[FreeTextEntry1] : \par Yessica's daughter translated\par \par Yessica is 6 wks s/p robotic reconstruction, sling, hernia repair\par very happy, no leakage of urine, no pain, no prolapse symptoms, no incomplete emptying, no constipation

## 2020-04-02 NOTE — DISCUSSION/SUMMARY
[FreeTextEntry1] : \par 6 wks s/p robotic reconstruction with hernia repair, doing well, no complaints, very happy, slowly return to normal activity. Return in 4 months, all questions were answered

## 2020-08-06 ENCOUNTER — APPOINTMENT (OUTPATIENT)
Dept: UROGYNECOLOGY | Facility: CLINIC | Age: 60
End: 2020-08-06
Payer: COMMERCIAL

## 2020-08-06 VITALS — SYSTOLIC BLOOD PRESSURE: 143 MMHG | DIASTOLIC BLOOD PRESSURE: 70 MMHG

## 2020-08-06 DIAGNOSIS — N95.2 POSTMENOPAUSAL ATROPHIC VAGINITIS: ICD-10-CM

## 2020-08-06 LAB
BILIRUB UR QL STRIP: NEGATIVE
CLARITY UR: CLEAR
COLLECTION METHOD: NORMAL
GLUCOSE UR-MCNC: NEGATIVE
HCG UR QL: 0.2 EU/DL
HGB UR QL STRIP.AUTO: NORMAL
KETONES UR-MCNC: NEGATIVE
LEUKOCYTE ESTERASE UR QL STRIP: NEGATIVE
NITRITE UR QL STRIP: NEGATIVE
PH UR STRIP: 5.5
PROT UR STRIP-MCNC: NEGATIVE
SP GR UR STRIP: 1.01

## 2020-08-06 PROCEDURE — 81003 URINALYSIS AUTO W/O SCOPE: CPT | Mod: NC,QW

## 2020-08-06 PROCEDURE — 99213 OFFICE O/P EST LOW 20 MIN: CPT

## 2020-08-06 NOTE — HISTORY OF PRESENT ILLNESS
[Cystocele (Obstetric)] : no [Vaginal Wall Prolapse] : no [Rectal Prolapse] : no [Stress Incontinence] : no [Urge Incontinence Of Urine] : no [Unable To Restrain Bowel Movement] : no [Urinary Frequency] : no [Urinary Frequency More Than Twice At Night (Nocturia)] : no nocturia [Urinary Stream Starts And Stops] : no [Feelings Of Urinary Urgency] : no [Constipation Obstructed Defecation] : no [Pain During Urination (Dysuria)] : no [Incomplete Emptying Of Stool] : no [] : no [Pelvic Pain] : no [Vaginal Pain] : no [FreeTextEntry1] : \dashawn Juan's daughter translated- declined offical  services \par \dashawn Juan is 6 months s/p robotic reconstruction, sling, hernia repair\dashawn continues to be very happy, no leakage of urine, no pain, no prolapse symptoms, no incomplete emptying, no constipation, no pain, no bleeding

## 2020-08-06 NOTE — DISCUSSION/SUMMARY
[FreeTextEntry1] : \par 6 months s/p robotic reconstruction with hernia repair, doing well, no complaints, very happy,  return as needed. all questions were answered

## 2020-10-20 NOTE — H&P PST ADULT - GRAVIDA, OB PROFILE

## 2020-12-21 PROBLEM — Z01.419 ENCOUNTER FOR ANNUAL ROUTINE GYNECOLOGICAL EXAMINATION: Status: RESOLVED | Noted: 2019-11-04 | Resolved: 2020-12-21

## 2023-02-10 NOTE — PHYSICAL EXAM
[No Acute Distress] : in no acute distress [Well developed] : well developed [Well Nourished] : ~L well nourished [Tenderness] : ~T no ~M abdominal tenderness observed [Distended] : not distended [Normal Appearance] : general appearance was normal [Aa ____] : Aa [unfilled] [Ba ____] : Ba [unfilled] [C ____] : C [unfilled] no [GH ____] : GH [unfilled] [PB ____] : PB [unfilled] [TVL ____] : TVL  [unfilled] [Ap ____] : Ap [unfilled] [Bp ____] : Bp [unfilled] [D ____] : D [unfilled] [Absent] : absent [Normal] : no abnormalities [FreeTextEntry4] : no mesh exposure, excellent support [de-identified] : PVR 0 by bladder scan

## 2023-06-07 NOTE — PHYSICAL EXAM
normal... [Chaperone Present] : A chaperone was present in the examining room during all aspects of the physical examination [No Acute Distress] : in no acute distress [Well developed] : well developed [Well Nourished] : ~L well nourished [Tenderness] : ~T no ~M abdominal tenderness observed [Distended] : not distended [Hernia] : no hernia observed [Inguinal LAD] : no adenopathy was noted in the inguinal lymph nodes [Aa ____] : Aa [unfilled] [Atrophy] : atrophy [Ba ____] : Ba [unfilled] [C ____] : C [unfilled] [GH ____] : GH [unfilled] [PB ____] : PB [unfilled] [TVL ____] : TVL  [unfilled] [Ap ____] : Ap [unfilled] [Bp ____] : Bp [unfilled] [D ____] : D [unfilled] [Absent] : absent [Normal] : no abnormalities [FreeTextEntry4] : no mesh exposure, excellent support, graft non tender  [de-identified] : PVR 0 by bladder scan

## 2024-02-14 NOTE — DISCHARGE NOTE NURSING/CASE MANAGEMENT/SOCIAL WORK - PATIENT PORTAL LINK FT
You can access the FollowMyHealth Patient Portal offered by [facility] by registering at the following website: [email or web address]. By joining [employer]s FollowMyHealth portal, you will also be able to view your health information using other applications (apps) compatible with our system. Statement Selected

## 2025-09-09 ENCOUNTER — APPOINTMENT (OUTPATIENT)
Dept: ORTHOPEDIC SURGERY | Facility: CLINIC | Age: 65
End: 2025-09-09
Payer: MEDICARE

## 2025-09-09 VITALS
HEIGHT: 64 IN | BODY MASS INDEX: 20.49 KG/M2 | DIASTOLIC BLOOD PRESSURE: 74 MMHG | WEIGHT: 120 LBS | SYSTOLIC BLOOD PRESSURE: 129 MMHG

## 2025-09-09 DIAGNOSIS — M25.561 PAIN IN RIGHT KNEE: ICD-10-CM

## 2025-09-09 PROCEDURE — 73564 X-RAY EXAM KNEE 4 OR MORE: CPT | Mod: RT

## 2025-09-09 PROCEDURE — 99203 OFFICE O/P NEW LOW 30 MIN: CPT

## 2025-09-09 RX ORDER — DICLOFENAC SODIUM 50 MG/1
50 TABLET, DELAYED RELEASE ORAL
Qty: 60 | Refills: 0 | Status: ACTIVE | COMMUNITY
Start: 2025-09-09 | End: 1900-01-01